# Patient Record
Sex: MALE | Race: WHITE
[De-identification: names, ages, dates, MRNs, and addresses within clinical notes are randomized per-mention and may not be internally consistent; named-entity substitution may affect disease eponyms.]

---

## 2020-10-20 ENCOUNTER — HOSPITAL ENCOUNTER (EMERGENCY)
Dept: HOSPITAL 61 - ER | Age: 40
Discharge: HOME | End: 2020-10-20
Payer: SELF-PAY

## 2020-10-20 VITALS — WEIGHT: 215.39 LBS | BODY MASS INDEX: 27.64 KG/M2 | HEIGHT: 74 IN

## 2020-10-20 VITALS — DIASTOLIC BLOOD PRESSURE: 71 MMHG | SYSTOLIC BLOOD PRESSURE: 117 MMHG

## 2020-10-20 DIAGNOSIS — F12.90: ICD-10-CM

## 2020-10-20 DIAGNOSIS — I45.10: ICD-10-CM

## 2020-10-20 DIAGNOSIS — F15.90: ICD-10-CM

## 2020-10-20 DIAGNOSIS — J40: Primary | ICD-10-CM

## 2020-10-20 DIAGNOSIS — F17.200: ICD-10-CM

## 2020-10-20 LAB
ALBUMIN SERPL-MCNC: 4 G/DL (ref 3.4–5)
ALBUMIN/GLOB SERPL: 1.2 {RATIO} (ref 1–1.7)
ALP SERPL-CCNC: 67 U/L (ref 46–116)
ALT SERPL-CCNC: 24 U/L (ref 16–63)
ANION GAP SERPL CALC-SCNC: 11 MMOL/L (ref 6–14)
APTT BLD: 28 SEC (ref 24–38)
AST SERPL-CCNC: 17 U/L (ref 15–37)
BASOPHILS # BLD AUTO: 0 X10^3/UL (ref 0–0.2)
BASOPHILS NFR BLD: 0 % (ref 0–3)
BILIRUB SERPL-MCNC: 0.5 MG/DL (ref 0.2–1)
BUN SERPL-MCNC: 19 MG/DL (ref 8–26)
BUN/CREAT SERPL: 16 (ref 6–20)
CALCIUM SERPL-MCNC: 8.6 MG/DL (ref 8.5–10.1)
CHLORIDE SERPL-SCNC: 103 MMOL/L (ref 98–107)
CK SERPL-CCNC: 86 U/L (ref 39–308)
CO2 SERPL-SCNC: 26 MMOL/L (ref 21–32)
CREAT SERPL-MCNC: 1.2 MG/DL (ref 0.7–1.3)
D DIMER PPP FEU-MCNC: 0.43 UG/MLFEU (ref 0–0.5)
EOSINOPHIL NFR BLD: 0.2 X10^3/UL (ref 0–0.7)
EOSINOPHIL NFR BLD: 1 % (ref 0–3)
ERYTHROCYTE [DISTWIDTH] IN BLOOD BY AUTOMATED COUNT: 14.2 % (ref 11.5–14.5)
GFR SERPLBLD BASED ON 1.73 SQ M-ARVRAT: 67.8 ML/MIN
GLUCOSE SERPL-MCNC: 112 MG/DL (ref 70–99)
HCT VFR BLD CALC: 45.3 % (ref 39–53)
HGB BLD-MCNC: 15.5 G/DL (ref 13–17.5)
LYMPHOCYTES # BLD: 4.1 X10^3/UL (ref 1–4.8)
LYMPHOCYTES NFR BLD AUTO: 34 % (ref 24–48)
MCH RBC QN AUTO: 29 PG (ref 25–35)
MCHC RBC AUTO-ENTMCNC: 34 G/DL (ref 31–37)
MCV RBC AUTO: 85 FL (ref 79–100)
MONO #: 0.8 X10^3/UL (ref 0–1.1)
MONOCYTES NFR BLD: 7 % (ref 0–9)
NEUT #: 6.9 X10^3/UL (ref 1.8–7.7)
NEUTROPHILS NFR BLD AUTO: 57 % (ref 31–73)
PLATELET # BLD AUTO: 386 X10^3/UL (ref 140–400)
POTASSIUM SERPL-SCNC: 4.3 MMOL/L (ref 3.5–5.1)
PROT SERPL-MCNC: 7.4 G/DL (ref 6.4–8.2)
RBC # BLD AUTO: 5.35 X10^6/UL (ref 4.3–5.7)
SODIUM SERPL-SCNC: 140 MMOL/L (ref 136–145)
WBC # BLD AUTO: 12 X10^3/UL (ref 4–11)

## 2020-10-20 PROCEDURE — 85379 FIBRIN DEGRADATION QUANT: CPT

## 2020-10-20 PROCEDURE — 99285 EMERGENCY DEPT VISIT HI MDM: CPT

## 2020-10-20 PROCEDURE — 36415 COLL VENOUS BLD VENIPUNCTURE: CPT

## 2020-10-20 PROCEDURE — 94640 AIRWAY INHALATION TREATMENT: CPT

## 2020-10-20 PROCEDURE — 85025 COMPLETE CBC W/AUTO DIFF WBC: CPT

## 2020-10-20 PROCEDURE — 93005 ELECTROCARDIOGRAM TRACING: CPT

## 2020-10-20 PROCEDURE — 96361 HYDRATE IV INFUSION ADD-ON: CPT

## 2020-10-20 PROCEDURE — 96375 TX/PRO/DX INJ NEW DRUG ADDON: CPT

## 2020-10-20 PROCEDURE — 85730 THROMBOPLASTIN TIME PARTIAL: CPT

## 2020-10-20 PROCEDURE — 83880 ASSAY OF NATRIURETIC PEPTIDE: CPT

## 2020-10-20 PROCEDURE — 80053 COMPREHEN METABOLIC PANEL: CPT

## 2020-10-20 PROCEDURE — 83605 ASSAY OF LACTIC ACID: CPT

## 2020-10-20 PROCEDURE — 82553 CREATINE MB FRACTION: CPT

## 2020-10-20 PROCEDURE — 96374 THER/PROPH/DIAG INJ IV PUSH: CPT

## 2020-10-20 PROCEDURE — 84484 ASSAY OF TROPONIN QUANT: CPT

## 2020-10-20 PROCEDURE — 71045 X-RAY EXAM CHEST 1 VIEW: CPT

## 2020-10-20 NOTE — PHYS DOC
Past Medical History


Past Medical History:  No Pertinent History


Past Surgical History:  No Surgical History


Smoking Status:  Current Every Day Smoker


Alcohol Use:  None


Drug Use:  None


Social History


Past marijuana and methamphetamine use.





General Adult


EDM:


Chief Complaint:  SHORTNESS OF BREATH





HPI:


HPI:





Patient is a 40 year old white male who presents with shortness of breath. He 

has a history of positive asymptomatic COVID infection and recent incarceration.

He is a current smoker. This began today while at rest. He reports difficulty 

breathing when he tried to lay flat on bed and on his left side. He states pain 

is located around his sternum and does not radiate. Pain is described as sharp 

and rated 6/10. He has not had any similar events. He states he was working 

outside earlier today. Patient had vomiting episode after getting out of bed and

denies any blood. Patient also complains of coughing fits without blood. Patient

denies any recent travel, large crowds without a mask, and denies any long car 

or plane rides. He denies any family history of heart or lung disease.  Denies 

fever or chills.  Denies trauma.  Denies leg swelling or calf tenderness.





Review of Systems:


Review of Systems:





Constitutional: Denies fever or chills 


Eyes: Denies redness or eye pain 


HENT: Denies nasal congestion or sore throat


Respiratory: Admits cough and shortness of breath 


Cardiovascular: Admits chest pain. Denies palpitations


GI: Denies abdominal pain, nausea, or vomiting


: Denies dysuria or hematuria


Musculoskeletal: Denies back pain or joint pain


Integument: Denies rash or skin lesions 


Neurologic: Denies headache, focal weakness or sensory changes





Complete systems were reviewed and found to be within normal limits, except as 

documented in this note.





Heart Score:


HEART Score for Chest Pain:  








HEART Score for Chest Pain Response (Comments) Value


 


History Slighlty/Non-Suspicious 0


 


ECG Normal 0


 


Age < 45 0


 


Risk Factors No Risk Factors 0


 


Troponin < Normal Limit 0


 


Total  0











Current Medications:





Current Medications








 Medications


  (Trade)  Dose


 Ordered  Sig/Kathrine  Start Time


 Stop Time Status Last Admin


Dose Admin


 


 Albuterol/


 Ipratropium


  (Duoneb)  3 ml  1X  ONCE  10/20/20 22:45


 10/20/20 22:46 UNV  





 


 Dexamethasone


 Sodium Phosphate


  (Decadron)  10 mg  1X  ONCE  10/20/20 22:45


 10/20/20 22:46 UNV  





 


 Ketorolac


 Tromethamine


  (Toradol 15mg


 Vial)  15 mg  1X  ONCE  10/20/20 22:45


 10/20/20 22:46 UNV  





 


 Ondansetron HCl


  (Zofran)  4 mg  1X  ONCE  10/20/20 22:45


 10/20/20 22:46 UNV  





 


 Sodium Chloride  1,000 ml @ 


 1,000 mls/hr  1X  ONCE  10/20/20 22:45


 10/20/20 23:44 UNV  














Allergies:


Allergies:





Allergies








Coded Allergies Type Severity Reaction Last Updated Verified


 


  No Known Drug Allergies    10/20/20 No











Physical Exam:


PE:





Constitutional: Well developed, well nourished, minimal acute distress, non-

toxic appearance


HENT: Normocephalic, atraumatic


Eyes: Conjunctiva normal, no discharge


Neck: Normal range of motion, no tenderness, supple


Lungs & Thorax:  No respiratory distress, equal chest rise and fall, diffuse 

wheezing


Abdomen: Soft, no tenderness


Skin: Warm, dry, no erythema, no rash


Extremities: No tenderness, ROM intact, no edema


Neurologic: Alert and oriented X 3, no focal deficits noted


Psychologic: Affect normal, judgment normal





Current Patient Data:


Vital Signs:





                                   Vital Signs








  Date Time  Temp Pulse Resp B/P (MAP) Pulse Ox O2 Delivery O2 Flow Rate FiO2


 


10/20/20 22:02 98.9 127 24 168/90 (116) 95 Room Air  





 98.9       











EKG:


EKG:


Findings: 


HR 96BPM


Intervals - IA 152ms, QRS 84ms, QT 358ms





Interpretation: Sinus rhythm, R-S transition zone in V leads displaced to the 

right, incomplete right bundle branch block, otherwise normal





Radiology/Procedures:


Radiology/Procedures:


PROCEDURE: CHEST AP ONLY





AP chest.


 


HISTORY: Short of air


 


AP view was taken of the chest. Lungs are clear. Heart is normal in size. 


There is no pleural effusion.


 


IMPRESSION:


1. No acute chest disease.


 


Electronically signed by: Levon Cm MD (10/20/2020 11:36 PM) [id number]





Course & Med Decision Making:


Course & Med Decision Making





Patient is a 39 yo white male who presents for shortness of breath. SOB began 

earlier tonight while at rest with pleurisy. He has a history of asymptomatic 

COVID and recent incarceration. PE was pertinent for diffuse wheezing. Patient 

was treated with fluids, dexamethasone, ketorlac, and albuterol/ipratropium 

treatment with significant improvement in symptoms. Pertinent Labs and Imaging 

studies reviewed and were negative. 





Patient stable for discharge with outpatient follow-up with PCP. Discussed 

findings and plan with patient, who acknowledges understanding and agreement.





Dragon Disclaimer:


Dragon Disclaimer:


This electronic medical record was generated, in whole or in part, using a voice

recognition dictation system.





Departure


Departure


Impression:  


   Primary Impression:  


   Bronchitis


Disposition:  01 DC HOME SELF CARE/HOMELESS


Condition:  STABLE


Referrals:  


NON,STAFF (PCP)


Patient Instructions:  Acute Bronchitis, Easy-to-Read


Scripts


Prednisone (PREDNISONE) 20 Mg Tablet


2 TAB PO DAILY, #8 TAB


   Start this prescripton tomorrow, Wednesday 10/21/2020


   Prov: PRASHANT DEWEY DO         10/20/20 


Albuterol Sulfate (Proair Hfa) 8.5 Gm Hfa.aer.ad


2 PUFF IH PRN Q4-6HRS PRN for wheezing, #1 INHALER 0 Refills


   Prov: PRASHANT DEWEY DO         10/20/20











PRASHANT DEWEY DO             Oct 20, 2020 22:41

## 2020-10-20 NOTE — RAD
AP chest.

 

HISTORY: Short of air

 

AP view was taken of the chest. Lungs are clear. Heart is normal in size. 

There is no pleural effusion.

 

IMPRESSION:

1. No acute chest disease.

 

Electronically signed by: Levon Cm MD (10/20/2020 11:36 PM) [id number]

## 2020-10-21 NOTE — EKG
[facility]

              [address] [place] [address]

Test Date:    2020-10-20               Test Time:    22:46:22

Pat Name:     SHERRON HEALY           Department:   

Patient ID:   [id number]           Room:          

Gender:       M                        Technician:   

:          1980               Requested By: PRASHANT DEWEY

Order Number: [id number]           Reading MD:     

                                 Measurements

Intervals                              Axis          

Rate:         96                       P:            55

MA:           152                      QRS:          57

QRSD:         84                       T:            34

QT:           358                                    

QTc:          453                                    

                           Interpretive Statements

SINUS RHYTHM

R-S TRANSITION ZONE IN V LEADS DISPLACED TO THE RIGHT

INCOMPLETE RIGHT BUNDLE BRANCH BLOCK

OTHERWISE NORMAL ECG

RI6.02

No previous ECG available for comparison